# Patient Record
(demographics unavailable — no encounter records)

---

## 2024-11-09 NOTE — HISTORY OF PRESENT ILLNESS
[Home] : at home, [unfilled] , at the time of the visit. [Other Location: e.g. Home (Enter Location, City,State)___] : at [unfilled] [Verbal consent obtained from patient] : the patient, [unfilled] [[id number]] : 30 yo male for evaluation of chronic prostatitis x 3 years, acute on chronic worsened 3 days. States in 2021 - was diagnosed as chronic nonbacterial prostatitis by multiple urologists after several workup and multiple courses of trial antibiotic treatments.  tried  urologist has treated it as bacterial   Reports he is sexually active with 1 partner  Never had any STI last sti testing 1 month ago, physical exam states he does not want antibiotics  did semen sample in the past